# Patient Record
Sex: FEMALE | Race: WHITE | NOT HISPANIC OR LATINO | Employment: STUDENT | URBAN - METROPOLITAN AREA
[De-identification: names, ages, dates, MRNs, and addresses within clinical notes are randomized per-mention and may not be internally consistent; named-entity substitution may affect disease eponyms.]

---
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
## 2025-01-22 ENCOUNTER — SPECIALTY PHARMACY (OUTPATIENT)
Dept: NEUROLOGY | Facility: CLINIC | Age: 20
End: 2025-01-22
Payer: COMMERCIAL

## 2025-03-12 ENCOUNTER — SPECIALTY PHARMACY (OUTPATIENT)
Dept: NEUROLOGY | Facility: CLINIC | Age: 20
End: 2025-03-12
Payer: COMMERCIAL

## 2025-03-12 NOTE — PROGRESS NOTES
Specialty Clinical Pharmacist Note    The neurology pharmacy team has made at least 6 attempts to reach this patient for education or refills of erenumab (Aimovig).  A [email or web address] message was sent to the patient, but we did not receive any response back from the patient. This patient has been temporarily unenrolled from the Specialty Patient Management Program. We will not attempt additional patient outreaches unless requested to do so by the provider, or we are contacted by the patient. If we hear back from patient, we are happy to re-enroll with the Specialty Patient Management Program at any time or answer any questions.    Thank you,    Jenae Hatfield, PharmD